# Patient Record
Sex: FEMALE | Race: WHITE | NOT HISPANIC OR LATINO | URBAN - METROPOLITAN AREA
[De-identification: names, ages, dates, MRNs, and addresses within clinical notes are randomized per-mention and may not be internally consistent; named-entity substitution may affect disease eponyms.]

---

## 2023-12-04 ASSESSMENT — LIFESTYLE VARIABLES
TOBACCO_USE: NO
HISTORY_ALCOHOL_USE: YES

## 2023-12-05 ENCOUNTER — CONSULT (OUTPATIENT)
Dept: ENDOCRINOLOGY | Facility: CLINIC | Age: 33
End: 2023-12-05
Payer: COMMERCIAL

## 2023-12-05 VITALS
TEMPERATURE: 98.1 F | DIASTOLIC BLOOD PRESSURE: 75 MMHG | WEIGHT: 124 LBS | HEIGHT: 62 IN | SYSTOLIC BLOOD PRESSURE: 107 MMHG | BODY MASS INDEX: 22.82 KG/M2 | HEART RATE: 72 BPM

## 2023-12-05 DIAGNOSIS — Z11.59 ENCOUNTER FOR SCREENING FOR OTHER VIRAL DISEASES: ICD-10-CM

## 2023-12-05 DIAGNOSIS — Z01.83 ENCOUNTER FOR RH BLOOD TYPING: ICD-10-CM

## 2023-12-05 DIAGNOSIS — Z11.3 SCREENING FOR STDS (SEXUALLY TRANSMITTED DISEASES): ICD-10-CM

## 2023-12-05 DIAGNOSIS — N97.9 FEMALE INFERTILITY: ICD-10-CM

## 2023-12-05 DIAGNOSIS — Z31.41 FERTILITY TESTING: Primary | ICD-10-CM

## 2023-12-05 DIAGNOSIS — Z01.812 ENCOUNTER FOR PREPROCEDURAL LABORATORY EXAMINATION: ICD-10-CM

## 2023-12-05 LAB
ABO GROUP (TYPE) IN BLOOD: NORMAL
ANTIBODY SCREEN: NORMAL
RH FACTOR (ANTIGEN D): NORMAL
TSH SERPL-ACNC: 2.4 MIU/L (ref 0.44–3.98)

## 2023-12-05 PROCEDURE — 86780 TREPONEMA PALLIDUM: CPT

## 2023-12-05 PROCEDURE — 86803 HEPATITIS C AB TEST: CPT | Performed by: STUDENT IN AN ORGANIZED HEALTH CARE EDUCATION/TRAINING PROGRAM

## 2023-12-05 PROCEDURE — 84443 ASSAY THYROID STIM HORMONE: CPT

## 2023-12-05 PROCEDURE — 86787 VARICELLA-ZOSTER ANTIBODY: CPT | Performed by: STUDENT IN AN ORGANIZED HEALTH CARE EDUCATION/TRAINING PROGRAM

## 2023-12-05 PROCEDURE — 86780 TREPONEMA PALLIDUM: CPT | Performed by: STUDENT IN AN ORGANIZED HEALTH CARE EDUCATION/TRAINING PROGRAM

## 2023-12-05 PROCEDURE — 87340 HEPATITIS B SURFACE AG IA: CPT

## 2023-12-05 PROCEDURE — 36415 COLL VENOUS BLD VENIPUNCTURE: CPT | Performed by: OBSTETRICS & GYNECOLOGY

## 2023-12-05 PROCEDURE — 87340 HEPATITIS B SURFACE AG IA: CPT | Performed by: STUDENT IN AN ORGANIZED HEALTH CARE EDUCATION/TRAINING PROGRAM

## 2023-12-05 PROCEDURE — 87800 DETECT AGNT MULT DNA DIREC: CPT | Performed by: STUDENT IN AN ORGANIZED HEALTH CARE EDUCATION/TRAINING PROGRAM

## 2023-12-05 PROCEDURE — 86901 BLOOD TYPING SEROLOGIC RH(D): CPT

## 2023-12-05 PROCEDURE — 84443 ASSAY THYROID STIM HORMONE: CPT | Performed by: STUDENT IN AN ORGANIZED HEALTH CARE EDUCATION/TRAINING PROGRAM

## 2023-12-05 PROCEDURE — 86317 IMMUNOASSAY INFECTIOUS AGENT: CPT | Performed by: STUDENT IN AN ORGANIZED HEALTH CARE EDUCATION/TRAINING PROGRAM

## 2023-12-05 PROCEDURE — 99215 OFFICE O/P EST HI 40 MIN: CPT | Mod: GC | Performed by: OBSTETRICS & GYNECOLOGY

## 2023-12-05 PROCEDURE — 86900 BLOOD TYPING SEROLOGIC ABO: CPT

## 2023-12-05 PROCEDURE — 86850 RBC ANTIBODY SCREEN: CPT | Performed by: STUDENT IN AN ORGANIZED HEALTH CARE EDUCATION/TRAINING PROGRAM

## 2023-12-05 PROCEDURE — 87389 HIV-1 AG W/HIV-1&-2 AB AG IA: CPT

## 2023-12-05 PROCEDURE — 86850 RBC ANTIBODY SCREEN: CPT

## 2023-12-05 PROCEDURE — 86803 HEPATITIS C AB TEST: CPT

## 2023-12-05 PROCEDURE — 87389 HIV-1 AG W/HIV-1&-2 AB AG IA: CPT | Performed by: STUDENT IN AN ORGANIZED HEALTH CARE EDUCATION/TRAINING PROGRAM

## 2023-12-05 PROCEDURE — 86317 IMMUNOASSAY INFECTIOUS AGENT: CPT

## 2023-12-05 PROCEDURE — 99205 OFFICE O/P NEW HI 60 MIN: CPT | Performed by: OBSTETRICS & GYNECOLOGY

## 2023-12-05 PROCEDURE — 83516 IMMUNOASSAY NONANTIBODY: CPT

## 2023-12-05 PROCEDURE — 83520 IMMUNOASSAY QUANT NOS NONAB: CPT | Performed by: STUDENT IN AN ORGANIZED HEALTH CARE EDUCATION/TRAINING PROGRAM

## 2023-12-05 PROCEDURE — 36415 COLL VENOUS BLD VENIPUNCTURE: CPT | Performed by: STUDENT IN AN ORGANIZED HEALTH CARE EDUCATION/TRAINING PROGRAM

## 2023-12-05 PROCEDURE — 36415 COLL VENOUS BLD VENIPUNCTURE: CPT

## 2023-12-05 PROCEDURE — 87800 DETECT AGNT MULT DNA DIREC: CPT

## 2023-12-05 PROCEDURE — 86787 VARICELLA-ZOSTER ANTIBODY: CPT

## 2023-12-05 PROCEDURE — 83520 IMMUNOASSAY QUANT NOS NONAB: CPT | Performed by: OBSTETRICS & GYNECOLOGY

## 2023-12-05 ASSESSMENT — COLUMBIA-SUICIDE SEVERITY RATING SCALE - C-SSRS
1. IN THE PAST MONTH, HAVE YOU WISHED YOU WERE DEAD OR WISHED YOU COULD GO TO SLEEP AND NOT WAKE UP?: NO
6. HAVE YOU EVER DONE ANYTHING, STARTED TO DO ANYTHING, OR PREPARED TO DO ANYTHING TO END YOUR LIFE?: NO
2. HAVE YOU ACTUALLY HAD ANY THOUGHTS OF KILLING YOURSELF?: NO

## 2023-12-05 ASSESSMENT — ENCOUNTER SYMPTOMS
OCCASIONAL FEELINGS OF UNSTEADINESS: 0
DEPRESSION: 0
LOSS OF SENSATION IN FEET: 0

## 2023-12-05 ASSESSMENT — PAIN SCALES - GENERAL: PAINLEVEL: 0-NO PAIN

## 2023-12-05 ASSESSMENT — PATIENT HEALTH QUESTIONNAIRE - PHQ9
2. FEELING DOWN, DEPRESSED OR HOPELESS: NOT AT ALL
SUM OF ALL RESPONSES TO PHQ9 QUESTIONS 1 AND 2: 0
1. LITTLE INTEREST OR PLEASURE IN DOING THINGS: NOT AT ALL

## 2023-12-05 NOTE — PROGRESS NOTES
In Person    NEW FERTILITY PATIENT VISIT    Referred by: Self/ Insurance  Accompanied today by: Who is accompanying you to your visit:: Benjamín Serrano is a 33 y.o.  female who presents with secondary infertility Please tell us your reason for this visit today: Infertility    They have one son together who is 7 years old. They have been trying to conceive for 6 years. Her partner had an emergency ileostomy and had trauma to nerves the week their first child was born. He now has retrograde ejaculation into the bladder. He has seen 3-5 urologist. He saw Dr. SHAW in , and they were able to freeze 4 vials of sperm.    Prior Labs  Lab Results    Date Done      AMH: No results found for requested labs within last 1825 days. No results found for requested labs within last 1825 days.   TSH: No results found for requested labs within last 1825 days. No results found for requested labs within last 1825 days.   PRL: No results found for requested labs within last 1825 days. No results found for requested labs within last 1825 days.   Testosterone: No results found for requested labs within last 1825 days. No results found for requested labs within last 1825 days.   DHEAS: No results found for requested labs within last 1825 days. No results found for requested labs within last 1825 days.   FSH: No results found for requested labs within last 1825 days. No results found for requested labs within last 1825 days.   17 OHP: No results found for requested labs within last 1825 days. No results found for requested labs within last 1825 days.   HgbA1c: No results found for requested labs within last 1825 days. No results found for requested labs within last 1825 days.   Hepatitis B surface antigen: No results found for requested labs within last 1825 days. No results found for requested labs within last 1825 days.   Hepatitis C antibody: No results found for requested labs within last 1825 days. No results found  for requested labs within last 1825 days.   HIV ½ Antigen Antibody screen with reflex: No results found for requested labs within last 1825 days. No results found for requested labs within last 1825 days.   Syphilis screening with reflex: No results found for requested labs within last 1825 days. No results found for requested labs within last 1825 days.   GC: No results found for requested labs within last 1825 days. No results found for requested labs within last 1825 days.   CT: No results found for requested labs within last 1825 days. No results found for requested labs within last 1825 days.   Type and Screen: No results found for requested labs within last 1825 days. No results found for requested labs within last 1825 days.   Rh: No results found for requested labs within last 1825 days. No results found for requested labs within last 1825 days.   Antibody: No results found for requested labs within last 1825 days. No results found for requested labs within last 1825 days.   Rubella: No results found for requested labs within last 1825 days. No results found for requested labs within last 1825 days.   Varicella: No results found for requested labs within last 1825 days. No results found for requested labs within last 1825 days.   Hemoglobin: No results found for requested labs within last 1825 days. No results found for requested labs within last 1825 days.   Hematocrit: No results found for requested labs within last 1825 days. No results found for requested labs within last 1825 days.   Creatinine: No results found for requested labs within last 1825 days. No results found for requested labs within last 1825 days.   AST:No results found for requested labs within last 1825 days. No results found for requested labs within last 1825 days.   ALT:No results found for requested labs within last 1825 days.: No results found for requested labs within last 1825 days.      Relationship Status:      OB Hx:       OB History          1    Para   1    Term   1       0    AB   0    Living   1         SAB   0    IAB   0    Ectopic   0    Multiple   0    Live Births   1               FT  no complications    GYN HISTORY   Have you ever been diagnosed with a sexually transmitted disease? Have you ever been diagnosed with a sexually transmitted disease?: No    Please select all that are applicable:    Have you ever had Pelvic Inflammatory Disease? Have you ever had Pelvic Inflammatory Disease?: No    Have you had an abnormal PAP smear? Have you had an abnormal PAP smear?: Yes    LEEP in 2022 HSIL     Date & Result of last PAP smear: NILM HPV-, HR negative  Have you ever had an abnormal Mammogram? Have you ever had an abnormal mammogram?: No    Date & result of your last mammogram:   no  Do you have pelvic pain? Do you have pelvic pain?: No    How many times per week do you have intercourse? If applicable, how many times a week are you having intercourse, trying to get pregnant during your fertile window?: Barely have sex    Do you have pain with intercourse? Do you have pain with intercourse?: Yes    Do you use lubricants with intercourse? If applicable, what type of lubricant are you using?: None    Do you have pain with bowel movements? Do you have pain with bowel movements?: No              Do you have pain with a full bladder? Do you have pain with a full bladder?: No  MENSTRUAL HISTORY  LMP: 2023    Menarche: If applicable, when was your first occurrence of menstruation?: 14 years old    Contraception: What (if any) type of birth control do you currently use?: None    Cycle length: What is the average number of days between menstrual cycles?: 28    Describe your bleeding: Describe your bleeding:: Average    Dysmenorrhea: Are your menstrual periods painful?: No      ENDOCRINE/INFERTILITY HISTORY  Duration of infertility: If applicable, what is the approximate date you began trying to get  pregnant?: More than 5 years    Coital Activity/week: If applicable, how many times a week are you having intercourse, trying to get pregnant during your fertile window?: Barely have sex    Nipple Discharge: Do you experience any loss of milk or liquid discharge from the breasts?: No    Vision changes: Are you experiencing any vision changes?: No    Headaches: Are you experiencing headaches?: No    Excess hair growth: Are you experiencing persistent or worsening hair growth on the face, breasts or lower abdomen?: No    Excessive hair loss: Are you experiencing loss of hair from your scalp?: No    Acne: Are you experiencing acne?: No    Oily skin: Oily skin?: No    Recent weight change  Weight gain: Are you experiencing any increase in weight?: No    Weight loss: Are you experiencing any decrease in weight?: No    Exercise more than 3 times a week: Exercise more than 3 times a week?: Yes    PMH  Past Medical History:   Diagnosis Date    Abnormal Pap smear of cervix      MEDICATIONS  No current outpatient medications on file prior to visit.     No current facility-administered medications on file prior to visit.      PSH  Past Surgical History:   Procedure Laterality Date    CERVICAL BIOPSY  W/ LOOP ELECTRODE EXCISION      TONSILLECTOMY        PSYCH HISTORY  Have you ever been diagnosed with a mental health Issue?: No    Have you ever been hospitalized for a mental health disorder?: No     SOCIAL HISTORY  Social History     Tobacco Use    Smoking status: Never    Smokeless tobacco: Never   Substance Use Topics    Alcohol use: Yes    Drug use: Never     Alcohol: on the weekends    Occupation: Your occupation::     Have you ever been incarcerated? Have you ever been incarcerated?: No    Do you have a history of domestic violence? Do you have a history of domestic violence?: No    Do you feel safe at home? Do you feel safe at home?: Yes    Do you have a history of any negative sexual experience such as incest or  rape? Do you have a history of any negative sexual experience such as incest or rape?: No       PARTNER HISTORY  Partner Name: Partner name:: Benjamín Serrano    : Partner :: 10/03/78    Occupation: Partner occupation::     Prior fertility history: Partner Prior Fertility History:: No problems until emergency ileostomy surgery  Surgery in     PMH: Partner Past Medical History:: diverticulitis, multiple hernias, enlarged ball    PSH: Partner Past Surgical History:: Bunion, ankle, grafted gums, three abdominal surgeries    Smoking:Partner: Recent or current tobacco use: No    Alcohol Use: Partner: Recent or current alcohol use: Yes    - on the weekends    Drug Use: Partner: Recent or current drug use: No    Medications: Partner Medications: None    Injuries: Partner: Any history of surgeries or injuries in the reproductive area?: Yes    STD: Have you ever been diagnosed with a sexually transmitted disease?: No    Please select all that are applicable:    SA: Prior Semen Analysis completed?: Yes    SA Results:     FAMILY HISTORY  Family History   Problem Relation Name Age of Onset    Other (bladder cancer) Father       CANCER HISTORY    Breast: Breast Cancer: Please answer Yes, No or Unsure. If Yes, please, identify which family member.: No    Ovarian: Ovarian Cancer: Please answer Yes, No or Unsure. If Yes, please, identify which family member.: No    Colon: Colon Cancer: Please answer Yes, No or Unsure. If Yes, please, identify which family member.: Uncle on dad’s side    Endometrial: Endometrial Cancer: Please answer Yes, No or Unsure. If Yes, please, identify which family member.: No    FAMILY VTE HISTORY  Family History of Blood Clots: Blood Clots: Please answer Yes, No or Unsure. If Yes, please, identify which family member.: No    GENETIC HISTORY  Ethnic Background  Patient: Ethnic Background Patient:: White    Partner: Ethnic Background Partner:: White    Genetic Disease in Family  Patient:  "Patient: Defects and genetic syndromes? Please answer Yes, No or Unsure: No    Partner: Partner: Defects and genetic syndromes? Please answer Yes, No or Unsure: No    Birth Defects in Family  Patient: Patient: Birth defects? Please answer Yes, No or Unsure: No    Partner: Partner: Birth defects? Please answer Yes, No or Unsure: No    Genetic screening performed previously:   never performed     BMI:   BMI Readings from Last 1 Encounters:   23 22.68 kg/m²     VITALS:  /75   Pulse 72   Temp 36.7 °C (98.1 °F)   Ht 1.575 m (5' 2\")   Wt 56.2 kg (124 lb)   LMP 2023 (Approximate)   BMI 22.68 kg/m²     ASSESSMENT   33 y.o.  female with male factor secondary infertility x 6 years.    Male Factor Infertility  - partner required emergency surgery and then had nerve damage which resulted in retrograde ejaculation  - he has 4 frozen sperm samples frozen with  labs; confirmed today with lab  - Will email Dr. SHAW to see if he recommends fresh collection on the morning of IUI  - plan for preconception labs, TSH, AMH, and HSG  - If all normal, will proceed with NC/OPK/IUI/ LP progesterone  - Pt to do OPK in the interim to identify when she typically tests positive  - Follow up with me in 4-6 weeks to review results    COUNSELING  We discussed causes of infertility including hormonal, egg quality issues, structural problems such as endometriosis, adhesions, or tubal problems, uterine factors such as polyps or fibroids, and sperm issues. Reviewed evaluation of such as well. We discussed various methods for achieving pregnancy in some detail including, ovulation induction, insemination, superovulation and IVF.    We discussed using Ovulation Predictor Kits to time fertility treatments.   Patient aware we recommend Clear Blue Easy Digital OPK (not advanced) however there are several choices for OPK on the market and any one that she chooses is fine to use.     We discussed that she should begin testing on " cycle day 10 (Day 1 is first day of full flow menses). We discussed that she is to use the second urine of the morning and call the office with + OPK prior to 4 pm that day if planning IUI.      Routine Testing  Fertility Center  STDs Within 1 year   Genetic carrier Waiver/Completed   T&S Within 1 year   AMH Within 1 year   TSH Within 1 year   Rubella/Varicella Within 5 years     BMI Testing  Fertility Center  CBC Within 1 year   CMP Within 1 year   HgbA1c Within 1 year   Mag, Phos, Vit D <18 Within 1 year   MFM > 40  REQ   Wt loss consult > 40 OPT     PLAN  Orders Placed This Encounter   Procedures    Hysterosalpingogram (HSG)    FL hysterosalpingogram    Antimullerian Hormone (Amh)    TSH with reflex to Free T4 if abnormal    Type And Screen    Rubella Antibody, Igg    Varicella Zoster Antibody, Igg    Hepatitis B surface antigen    Hepatitis C Antibody    HIV-1 and HIV-2 antibodies    Syphilis Screen with Reflex    C. Trachomatis / N. Gonorrhoeae, Amplified Detection    POCT pregnancy, urine manually resulted     GENETIC SCREENING PATIENT  Patient declined- will need to sign waiver    PARTNER  Yes Semen Analysis: Completed previous  Genetic screening: partner declined, will need to sign waiver    FOLLOW UP   Consults:  none  Chart to primary nurse for care coordination and patient check list/education  Enroll in Engaged MD  Take prenatal vitamins, vitamin D 2000 IUs daily  Discussed that PAP and mammogram must be updated if appropriate based on age and clinical history and results received before treatment can begin  Discussed that treatment cannot proceed until checklist items are complete   Follow up with me in 4-6 weeks    Alyssa Cuba  12/05/2023  11:39 AM

## 2023-12-06 LAB
C TRACH RRNA SPEC QL NAA+PROBE: NEGATIVE
HBV SURFACE AG SERPL QL IA: NONREACTIVE
HCV AB SER QL: NONREACTIVE
HIV 1+2 AB+HIV1 P24 AG SERPL QL IA: NONREACTIVE
N GONORRHOEA DNA SPEC QL PROBE+SIG AMP: NEGATIVE
RUBV IGG SERPL IA-ACNC: 2.4 IA
RUBV IGG SERPL QL IA: POSITIVE
T PALLIDUM AB SER QL: NONREACTIVE
VARICELLA ZOSTER IGG INDEX: 3.8 IA
VZV IGG SER QL IA: POSITIVE

## 2023-12-08 DIAGNOSIS — N97.9 FEMALE INFERTILITY: ICD-10-CM

## 2023-12-08 DIAGNOSIS — Z31.89 ENCOUNTER FOR ARTIFICIAL INSEMINATION: ICD-10-CM

## 2023-12-08 LAB — MIS SERPL-MCNC: 7.04 NG/ML (ref 0.18–11.71)

## 2023-12-08 NOTE — PROGRESS NOTES
Placed call to patient to touch base and review checklist items. Relayed to patient she will be sent Engaged MD forms to complete, and to call with her period to schedule her HSG. Dr. Cuba to confirm with Dr. SHAW whether further sperm collections are warranted prior to signing off boarding pass. Patient verbalized understanding, all questions answered at this time.   RICCO REED RN 12/08/2023 09:22

## 2023-12-08 NOTE — PROGRESS NOTES
Boarding Pass Oral/Injectible with TIC/IUI Checklist    Age: 33 y.o.    Provider: IVF Fellow  Primary RN: Jennifer Jaramillo  Reasons for Treatment: Male infertility (retrograde ejaculation)   Last BMI  23 : 22.68 kg/m²       Past Medical History:   Diagnosis Date    Abnormal Pap smear of cervix      Ectopic Risk: N    Date Done Consultation Results/Comments   2023 Medication Protocol  If all normal, will proceed with NC/OPK/IUI/ 100mg LP progesterone     Insurance Procedure Order Placed?     Genetic Carrier Screening Clearance Declined [facility] screening.   N/A MFM Consult Okay to proceed? N/A   N/A Psych Consult Okay to proceed? N/A   N/A Genetics Consult Okay to proceed? N/A    Other    Date Done Female Labs Results/Comments   2023 T&S (Q 1 Year) ABO: O  Rh: POS  Antibody: NEG   2023 Hep B sAg Nonreactive   2023 Hep C AB Nonreactive   2023 HIV Nonreactive   2023 Syphilis Nonreactive   2023 GC/CT GC: Negative  CT: Negative   2023 Rubella (Q 5 Years) Positive     2023 Varicella (Q 5 Years) Positive     2023 TSH 2.40 (Ref range: 0.44 - 3.98 mIU/L)        2023 AMH 7.043    Carrier Screening Declined  N/A   2024 Uterine Cavity Eval HSG: Uterus:  normal contour without filling defects  Tubes:  bilateral patency with free spill of dye, normal tubal architecture noted, and no loculations present.    SIS: N/A    Hyster: (N/A)     2023 Pap Smear (Q 5 Years) WNL (did have an abnormal PAP in )   N/A Mammogram ( > 40) (Q 1 Year) N/A               Date Done Male Labs   Benjamín Serrano  [id number] Results/Comments   2023 Hep B sAg Negative   2023 Hep C AB  Negative   2023  HIV Negative   2023 Syphilis Negative   2023 GC/CT Negative    N/A Carrier Screening N/A  Declined  N/A   N/A Semen Analysis -- (has retrograde ejaculation, has four frozen vials of sperm)   Dr. SHAW recommendation: -will re-prescribe sodium bicarb for next  IUI for fresh specimen collection  -will also discuss with ASHISH and lab regarding catheterizing the bladder and inserting sperm wash media prior to collecting specimen     Date Done Miscellaneous Results/Comments    BMI Checklist  BMI > 40 or < 18 Added to chart:       >= 45 Checklist  Added to chart:        Boarding pass reviewed with:  Protocol: NC/OPK/IUI/ 100mg LP   Sperm: partner with special prp: Dr. SHAW recommendation: -will re-prescribe sodium bicarb for next IUI for fresh specimen collection  -will also discuss with ASHISH and lab regarding catheterizing the bladder and inserting sperm wash media prior to collecting specimen    Testing up to date. yes  Procedure order placed. yes    Boarding pass approved for 3 cycles/until 12/24    Maryan Payne  11:56 AM  12/08/2023

## 2024-01-23 ENCOUNTER — TELEMEDICINE (OUTPATIENT)
Dept: ENDOCRINOLOGY | Facility: CLINIC | Age: 34
End: 2024-01-23
Payer: COMMERCIAL

## 2024-01-23 VITALS — WEIGHT: 118 LBS | BODY MASS INDEX: 21.71 KG/M2 | HEIGHT: 62 IN

## 2024-01-23 DIAGNOSIS — Z31.41 FERTILITY TESTING: Primary | ICD-10-CM

## 2024-01-23 DIAGNOSIS — N97.9 FEMALE INFERTILITY: ICD-10-CM

## 2024-01-23 PROCEDURE — 99214 OFFICE O/P EST MOD 30 MIN: CPT | Performed by: OBSTETRICS & GYNECOLOGY

## 2024-01-23 ASSESSMENT — PATIENT HEALTH QUESTIONNAIRE - PHQ9
SUM OF ALL RESPONSES TO PHQ9 QUESTIONS 1 AND 2: 0
1. LITTLE INTEREST OR PLEASURE IN DOING THINGS: NOT AT ALL
2. FEELING DOWN, DEPRESSED OR HOPELESS: NOT AT ALL

## 2024-01-23 ASSESSMENT — PAIN SCALES - GENERAL: PAINLEVEL: 0-NO PAIN

## 2024-01-23 NOTE — PROGRESS NOTES
Virtual Visit    Follow Up Visit HPI    Patient is a 33 y.o.  female with Male infertility presenting today for follow up visit.     Her partner and her have been trying to conceive for 6 years. Her partner had an emergency ileostomy and had trauma to nerves the week their first child was born. He now has retrograde ejaculation into the bladder. He has seen 3-5 urologist. He saw Dr. SHAW in , and they were able to freeze 4 vials of sperm.     At our last appt, we planned for NC/OPK/IUI. She has had her lab work since her last appt which is all WNL. She is scheduled for an HSG this upcoming Monday.     Her partner is going to schedule an appt with Dr. SHAW. Unsure if best plan will be for retrograde collection on AM of IUI or if we should use frozen vials.     Testing to date:   Result Date Done   Type and Screen: O 2023   Rh: POS 2023   Antibody: NEG (Ref range: )  No results found for requested labs within last 1825 days.   Rubella: Positive (Ref range: Negative) 2023   Varicella: Positive (A; Ref range: Negative) 2023   TSH: 2.40 (Ref range: 0.44 - 3.98 mIU/L) 2023   AMH: 7.043 (Ref range: 0.176 - 11.705 ng/mL) 2023   PRL: No results found for requested labs within last 365 days. No results found for requested labs within last 365 days.   Testosterone: No results found for requested labs within last 365 days. No results found for requested labs within last 365 days.   DHEAS: No results found for requested labs within last 365 days. No results found for requested labs within last 365 days.   FSH: No results found for requested labs within last 365 days. No results found for requested labs within last 365 days.   17 OHP: No results found for requested labs within last 365 days. No results found for requested labs within last 365 days.   Hepatitis B surface antigen: Nonreactive (Ref range: Nonreactive) 2023   Hepatitis C antibody: Nonreactive (Ref range: Nonreactive) 2023  "  HIV ½ Antigen Antibody screen with reflex: Nonreactive (Ref range: Nonreactive) 2023   Syphilis screening with reflex: Nonreactive (Ref range: Nonreactive) 2023     Hysterosalpingogram: pending  GYN Pelvic Ultrasound: pending    Partner SA:  retrograde ejaculation, normal concentration and TMS on frozen samples    Treatment to date: none    Past Medical History:   Diagnosis Date    Abnormal Pap smear of cervix      Past Surgical History:   Procedure Laterality Date    CERVICAL BIOPSY  W/ LOOP ELECTRODE EXCISION      TONSILLECTOMY       Current Outpatient Medications on File Prior to Visit   Medication Sig Dispense Refill    prenatal vit37/iron/folic acid (PRENATA ORAL) Take 1 tablet by mouth once daily.       No current facility-administered medications on file prior to visit.       BMI:   BMI Readings from Last 1 Encounters:   24 21.58 kg/m²     VITALS:  Ht 1.575 m (5' 2\")   Wt 53.5 kg (118 lb)   LMP 2024 (Exact Date)   BMI 21.58 kg/m²   LMP: Patient's last menstrual period was 2024 (exact date).    ASSESSMENT   Patient is a 33 y.o.  female with Male infertility presenting today for follow up visit.     Male Factor Infertility  - partner required emergency surgery and then had nerve damage which resulted in retrograde ejaculation  - he has 4 frozen sperm samples frozen with  labs; confirmed with lab  - Will reach out to Dr. SHAW to see if he recommends fresh collection on the morning of IUI   - will reach out to andrology lab to make them aware of possible retrograde ejaculation collection if recommended by Dr. SHAW  - preconception labs, TSH, AMH all WNL  - HSG and TVUS pending  - If all normal, will proceed with NC/OPK/IUI/ LP progesterone (aiming for Feb cycle but aware it may be March)    Routine Testing  Fertility Center  STDs Within 1 year   Genetic carrier Waiver/Completed   T&S Within 1 year   AMH Within 1 year   TSH Within 1 year   Rubella/Varicella Within 5 years "     BMI Testing  [facility] Center  CBC Within 1 year   CMP Within 1 year   HgbA1c Within 1 year   Mag, Phos, Vit D <18 Within 1 year   MFM > 40  REQ   Wt loss consult > 40 OPT     PLAN  Orders Placed This Encounter   Procedures    US pelvis transvaginal       FOLLOW UP   Consults:  urology consultation for partner- scheduled for 1/29  Engaged MD  Take prenatal vitamins, vitamin D 2000 IUs daily  Discussed that treatment cannot proceed until checklist items are complete.   Additional testing for BMI < 18 or > 40: NA.  Patient to schedule follow up visit after 3 cycles of IUI. Advised patient to arrange this now with the .  Chart to primary nurse for care coordination and patient check list/education.      Alyssa Cuba  01/23/2024  2:43 PM

## 2024-01-29 ENCOUNTER — ANCILLARY PROCEDURE (OUTPATIENT)
Dept: ENDOCRINOLOGY | Facility: CLINIC | Age: 34
End: 2024-01-29

## 2024-01-29 ENCOUNTER — HOSPITAL ENCOUNTER (OUTPATIENT)
Dept: RADIOLOGY | Facility: HOSPITAL | Age: 34
Discharge: HOME | End: 2024-01-29
Payer: COMMERCIAL

## 2024-01-29 DIAGNOSIS — Z01.812 ENCOUNTER FOR PREPROCEDURAL LABORATORY EXAMINATION: ICD-10-CM

## 2024-01-29 DIAGNOSIS — Z31.41 FERTILITY TESTING: ICD-10-CM

## 2024-01-29 DIAGNOSIS — Z31.41 FERTILITY TESTING: Primary | ICD-10-CM

## 2024-01-29 LAB — PREGNANCY TEST URINE, POC: NEGATIVE

## 2024-01-29 PROCEDURE — 2550000001 HC RX 255 CONTRASTS: Performed by: STUDENT IN AN ORGANIZED HEALTH CARE EDUCATION/TRAINING PROGRAM

## 2024-01-29 PROCEDURE — 58340 CATHETER FOR HYSTEROGRAPHY: CPT

## 2024-01-29 PROCEDURE — 81025 URINE PREGNANCY TEST: CPT

## 2024-01-29 PROCEDURE — 76830 TRANSVAGINAL US NON-OB: CPT

## 2024-01-29 PROCEDURE — 74740 X-RAY FEMALE GENITAL TRACT: CPT | Performed by: OBSTETRICS & GYNECOLOGY

## 2024-01-29 PROCEDURE — 74740 X-RAY FEMALE GENITAL TRACT: CPT

## 2024-01-29 RX ADMIN — IOHEXOL 20 ML: 240 INJECTION, SOLUTION INTRATHECAL; INTRAVASCULAR; INTRAVENOUS; ORAL at 15:28

## 2024-01-29 NOTE — PROGRESS NOTES
Patient ID: Sarah Serrano is a 33 y.o. female.    HSG    Date/Time: 1/29/2024 12:20 PM    Performed by: MEHDI Smith  Authorized by: MEHDI Smith    Consent:     Consent obtained:  Verbal and written    Consent given by:  Patient    Risks, benefits, and alternatives were discussed: yes      Risks discussed:  Bleeding, infection and pain  Universal protocol:     Procedure explained and questions answered to patient or proxy's satisfaction: yes      Relevant documents present and verified: yes      Test results available: yes      Imaging studies available: yes      Required blood products, implants, devices, and special equipment available: yes      Immediately prior to procedure, a time out was called: yes      Patient identity confirmed:  Verbally with patient, arm band and hospital-assigned identification number  Indications:     Indications:  Fertility testing  Pre-procedure details:     Skin preparation:  Povidone-iodine  Sedation:     Sedation type:  None  Anesthesia:     Anesthesia method:  None  Procedure specific details:      Assistant: AISHA Sal CNP performed this test      Hysterosalpingogram (HSG) risks, benefits, alternatives, and personnel discussed with patient who agreed to proceed.    Procedural time out        Done in room where procedure done: Yes        Done just before starting procedure: Yes                                                 All members of procedural team involved in time-out: Yes                  Active communication used: Yes                                                         All team members agreed on procedure: Yes                                        Patient correctly identified by two identifiers: Yes                                  Correct side and site identified: Yes                                                     All needed special equipment/instruments available: Yes               Prior to the start of the procedure a time out was taken  and the following were verified: The identity of the patient using two patient identifiers.   Urine pregnancy test was performed and was negative.   Risks, benefits, and alternatives of the procedure were explained to the patient.  Informed consent was obtained.     The patient was placed in the dorsal lithotomy position and a sterile speculum was placed in the vagina. The cervix was sterilized with Betadine x 3. The anterior lip of the cervix was grasped with a single-tooth tenaculum. The acorn cannula was then placed in the cervix. The patient was positioned and images were taken with fluoroscopy as dye was inserted through the cannula. All instruments were then removed. The patient tolerated the procedure well and was discharged home the same day without complications.    PRELIMINARY NURSE PRACTITIONER ASSESSMENT - FOR A FINAL REPORT, PLEASE REFER TO THE FINALIZED DOCUMENTATION IN THE IMAGING TAB   Uterus:  normal contour without filling defects  Tubes:  bilateral patency with free spill of dye, normal tubal architecture noted, and no loculations present.  Based on these findings, my recommendation is: No further follow up required.    The patient was counseled regarding the above preliminary findings and understands these will be reviewed by the reading physician.       01/29/24 at 4:13 PM - ADONIS Smith-ISA     Post-procedure details:     Procedure completion:  Tolerated well, no immediate complications

## 2024-01-30 ENCOUNTER — TELEPHONE (OUTPATIENT)
Dept: ENDOCRINOLOGY | Facility: CLINIC | Age: 34
End: 2024-01-30
Payer: COMMERCIAL

## 2024-01-30 NOTE — TELEPHONE ENCOUNTER
Unable to reach patient by phone or leave voicemail. Sent [employer] message to touch base with patient.   RICCO REED RN 01/30/2024 10:58

## 2024-02-02 ENCOUNTER — PREP FOR PROCEDURE (OUTPATIENT)
Dept: ENDOCRINOLOGY | Facility: CLINIC | Age: 34
End: 2024-02-02
Payer: COMMERCIAL

## 2024-02-02 ENCOUNTER — DOCUMENTATION (OUTPATIENT)
Dept: ENDOCRINOLOGY | Facility: CLINIC | Age: 34
End: 2024-02-02
Payer: COMMERCIAL

## 2024-02-02 NOTE — PROGRESS NOTES
Telephone Encounter    Attempted to call patient to discuss next steps and results of HSG as she requested during our last appt. Pt did not answer. Jennifer LEIVA sent message to patient to touch base on next steps.    Alyssa Cuba MD PGY 5  Reproductive Endocrinology and Infertility Fellow

## 2024-02-05 RX ORDER — PROGESTERONE 100 MG/1
100 CAPSULE ORAL 2 TIMES DAILY
Qty: 60 CAPSULE | Refills: 2 | Status: SHIPPED | OUTPATIENT
Start: 2024-02-05

## 2024-02-26 ENCOUNTER — TELEPHONE (OUTPATIENT)
Dept: ENDOCRINOLOGY | Facility: CLINIC | Age: 34
End: 2024-02-26
Payer: COMMERCIAL

## 2024-02-26 NOTE — TELEPHONE ENCOUNTER
Returned call to patient. Patient reported that based on her cycle trends, she believes her OPK surge may fall over a weekend and wanted to confirm she can still do her IUI over a weekend. Relayed to patient that we can do this (informed patient that partner will need to do the retrograde sodium bicarb prep per protocol) and she will just need to tell the answering service she needs to schedule her IUI.     Patient verbalized understanding, all questions answered at this time.     RICCO REED RN 02/26/2024 09:19

## 2024-02-26 NOTE — TELEPHONE ENCOUNTER
Reason for call:   Notes: Patient wants speak with her nurse about her ovulation and falling on a weekend

## 2024-03-07 ENCOUNTER — TELEPHONE (OUTPATIENT)
Dept: ENDOCRINOLOGY | Facility: CLINIC | Age: 34
End: 2024-03-07
Payer: COMMERCIAL

## 2024-03-07 NOTE — TELEPHONE ENCOUNTER
Returned call to patient. Confirmed patient had positive OPK surge. Partner has completed prep for retrograde ejaculation. Patient will be gone 2/21-2/27 ... Relayed that we can always have her test for pregnancy with UPT while on trip and obtain HCG if indicated when she is back. Reviewed that post-IUI we always have patients assume they are pregnant until known otherwise.     Patient verbalized understanding of plan, all questions answered at this time.     RICCO REED RN 03/07/2024 10:01

## 2024-03-08 ENCOUNTER — PROCEDURE VISIT (OUTPATIENT)
Dept: ENDOCRINOLOGY | Facility: CLINIC | Age: 34
End: 2024-03-08

## 2024-03-08 ENCOUNTER — DOCUMENTATION (OUTPATIENT)
Dept: ENDOCRINOLOGY | Facility: CLINIC | Age: 34
End: 2024-03-08
Payer: COMMERCIAL

## 2024-03-08 DIAGNOSIS — Z31.89 ENCOUNTER FOR ARTIFICIAL INSEMINATION: Primary | ICD-10-CM

## 2024-03-08 PROCEDURE — 58322 ARTIFICIAL INSEMINATION: CPT

## 2024-03-08 PROCEDURE — 89353 THAWING CRYOPRESRVED SPERM: CPT | Performed by: OBSTETRICS & GYNECOLOGY

## 2024-03-08 PROCEDURE — 89260 SPERM ISOLATION SIMPLE: CPT | Performed by: OBSTETRICS & GYNECOLOGY

## 2024-03-08 NOTE — PROGRESS NOTES
Patient is scheduled for Natural/Partner IUI today- partner with 4 vials frozen sperm 2021- he has retrograde ejaculation. Collected today for IUI, no sperm seen- will plan to thaw 1 vial frozen sperm per pt and spouse request for IUI today. Latrice Sal 03/08/2024 9:31 AM

## 2024-03-08 NOTE — PROGRESS NOTES
"Patient ID: Sarah Serrano is a 33 y.o. female.    Intrauterine Insemination (IUI)    Date/Time: 3/8/2024 10:13 AM    Performed by: MEHDI Smith  Authorized by: MEHDI Arreguin    Consent:     Consent obtained:  Verbal and written    Consent given by:  Patient    Procedure risks and benefits discussed: yes      Patient questions answered: yes      Patient agrees, verbalizes understanding, and wants to proceed: yes      Educational handouts given: yes      Instructions and paperwork completed: yes    Procedure:     Pelvic exam performed: yes      Speculum placed in vagina: yes      Tenaculum applied to cervix: no      Type of insemination: intrauterine insemination      Ultrasound guidance: No      Speculum type: Ellen      Catheter type: curved      Curvature: mild      Difficulty: easy      Estimated Blood Loss:  None    Specimen Return: none    Post-procedure:     Patient tolerated procedure well: yes      Post-procedure plan: patient counseled on signs and symptoms for which to call and/or return to clinic    Comments:     Procedure comments:  IUI Procedure note:    The patient presented today for IUI.     Consent signed by patient, IUI sample identified by patient, and Final Verification performed with patient via electronic system \"\" prior to insemination.   All patient's questions were discussed and answered.          Chaperone offered to patient for intimate exam: Patient declined chaperone  Name of chaperone: N/A    Specimen Source: Partner  Specimen Condition: Frozen  TMS no sperm seen in fresh collection today, discussed with patient and spouse and decision made to thaw 1 frozen vial of sperm. 1 vial frozen sperm thawed with TMS of 10.43 million & 13% motility. Discussed plan for future of need to thaw additional sperm samples- they will reach out to DR. SHAW today to inform of what happened and no motile sperm seen in fresh sample. Spouse may try to freeze additional samples for " IUI. We did discuss IVF as a possibility and they are not sure that they can afford to do. Will reach back to us after they have had time to talk. They have 3 remaining vials of frozen sperm.     Additional notes:    Patient was advised to call office if she develops fever, chills, pelvic pain, or heavy bleeding.    Progesterone and post-IUI teaching completed. Will start Progesterone three days after IUI and continue twice daily. Pt will do a home pregnancy test two weeks from IUI date. If home pregnancy test positive, patient will continue Progesterone and call office to schedule BHCG. If home pregnancy test negative, patient will discontinue Progesterone, and was advised to call office with start of menses; will proceed with another cycle if appropriate.  Patient verbalized understanding of plan.    Latrice Sal CNP 3-8-2024 @ 10:52 AM

## 2024-03-30 ENCOUNTER — TELEPHONE (OUTPATIENT)
Dept: ENDOCRINOLOGY | Facility: CLINIC | Age: 34
End: 2024-03-30
Payer: COMMERCIAL

## 2024-03-31 NOTE — PROGRESS NOTES
Telephone Encounter     Received page from patient today stating she is starting CD 1. She expressed frustration over last cycle as there was no sperm found in retrograde ejaculation and they needed to thaw a frozen vial. She was also concerned given motility of 13%. Explained that although percentage is low, TMS was adequate for IUI at 10 million. Recommended follow up appt with me on April 16 to discuss next steps to optimize next  cycle. Patient and  did not want to schedule follow up appt and wanted to proceed with another IUI cycle with the understanding that they may need to thaw another sample.     Alyssa Cuba MD

## 2024-04-09 ENCOUNTER — DOCUMENTATION (OUTPATIENT)
Dept: ENDOCRINOLOGY | Facility: CLINIC | Age: 34
End: 2024-04-09
Payer: COMMERCIAL

## 2024-04-09 DIAGNOSIS — Z31.89 ENCOUNTER FOR ARTIFICIAL INSEMINATION: ICD-10-CM

## 2024-04-09 NOTE — PROGRESS NOTES
Patient called with OPK surge. Orders pended for IUI and partner to Dr. Olmedo. Patient to call within the hour to schedule IUI after confirming with  orders need placed prior to scheduling.   RICCO REED RN 04/09/2024 12:38

## 2024-04-10 ENCOUNTER — PROCEDURE VISIT (OUTPATIENT)
Dept: ENDOCRINOLOGY | Facility: CLINIC | Age: 34
End: 2024-04-10

## 2024-04-10 DIAGNOSIS — Z31.89 ENCOUNTER FOR ARTIFICIAL INSEMINATION: ICD-10-CM

## 2024-04-10 PROCEDURE — 89261 SPERM ISOLATION COMPLEX: CPT | Performed by: NURSE PRACTITIONER

## 2024-04-10 PROCEDURE — 89353 THAWING CRYOPRESRVED SPERM: CPT | Performed by: NURSE PRACTITIONER

## 2024-04-10 PROCEDURE — 58322 ARTIFICIAL INSEMINATION: CPT | Performed by: NURSE PRACTITIONER

## 2024-04-10 NOTE — PROGRESS NOTES
"Patient ID: Sarah Serrano is a 33 y.o. female.    Intrauterine Insemination (IUI)    Date/Time: 4/10/2024 10:44 AM    Performed by: ADONIS Arreguin-CNP  Authorized by: Nubia Manzano MD    Consent:     Consent obtained:  Verbal and written    Consent given by:  Patient    Procedure risks and benefits discussed: yes      Patient questions answered: yes      Patient agrees, verbalizes understanding, and wants to proceed: yes      Educational handouts given: yes      Instructions and paperwork completed: yes    Procedure:     Pelvic exam performed: yes      Speculum placed in vagina: yes      Tenaculum applied to cervix: no      Type of insemination: intrauterine insemination      Ultrasound guidance: No      Speculum type: Ellen      Catheter type: curved      Curvature: mild      Difficulty: easy      Estimated Blood Loss:  None    Specimen Return: none    Post-procedure:     Patient tolerated procedure well: yes      Post-procedure plan: patient counseled on signs and symptoms for which to call and/or return to clinic    Comments:     Procedure comments:  IUI Procedure note:    The patient presented today for IUI.     Consent signed by patient, IUI sample identified by patient, and Final Verification performed with patient via electronic system \"\" prior to insemination.   All patient's questions were discussed and answered.          Chaperone offered to patient for intimate exam: Patient declined chaperone  Name of chaperone: N/A    Specimen Source: Partner  Specimen Condition: Frozen  TMS 14.1 mil    Additional notes: Fresh sample today had no sperm in the sample. Plan to thaw 1 vial and leave remaining vials for IVF.    Patient was advised to call office if she develops fever, chills, pelvic pain, or heavy bleeding.    Progesterone and post-IUI teaching completed. Will start Progesterone three days after IUI and continue twice daily. Pt will do a home pregnancy test two weeks from IUI date. If " home pregnancy test positive, patient will continue Progesterone and call office to schedule BHCG. If home pregnancy test negative, patient will discontinue Progesterone, and was advised to call office with start of menses; will proceed with another cycle if appropriate.  Patient verbalized understanding of plan.      Maryan Payne 04/10/24 10:45 AM